# Patient Record
Sex: MALE | Race: WHITE | Employment: UNEMPLOYED | URBAN - METROPOLITAN AREA
[De-identification: names, ages, dates, MRNs, and addresses within clinical notes are randomized per-mention and may not be internally consistent; named-entity substitution may affect disease eponyms.]

---
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
## 2023-09-01 ENCOUNTER — NURSE TRIAGE (OUTPATIENT)
Dept: NURSING | Facility: CLINIC | Age: 18
End: 2023-09-01
Payer: COMMERCIAL

## 2023-09-02 NOTE — TELEPHONE ENCOUNTER
"Pt was playing basketball and running and stepped bottom of left arch popped really hard and then got a little swollen and it is difficult to walk.  Not super bad if sitting.  This happened about 1 1/2 hrs ago.  Pain \"3-4\" on 0/10 pain scale.  Pt will follow Care Advice and follow up with clinic if anything needed further.   Isaura Toney RN  [facility] Nurse Advisor    Reason for Disposition   [1] Limp when walking AND [2] due to a twisted ankle or foot    Additional Information   Negative: Serious injury with multiple fractures (broken bones)   Negative: [1] Major bleeding (e.g., actively dripping or spurting) AND [2] can't be stopped   Negative: Amputation   Negative: Looks like a dislocated joint (very crooked or deformed)   Negative: Sounds like a life-threatening emergency to the triager   Negative: Wound looks infected   Negative: Caused by an animal bite   Negative: Caused by a human bite   Negative: Puncture wound of foot   Negative: Toe injury is main concern   Negative: Cast problems or questions   Negative: Bullet wound, stabbed by knife, or other serious penetrating wound   Negative: Skin is split open or gaping (or length > 1/2 inch or 12 mm)   Negative: [1] Bleeding AND [2] won't stop after 10 minutes of direct pressure (using correct technique)   Negative: [1] Dirt in the wound AND [2] not removed with 15 minutes of scrubbing   Negative: Can't stand (bear weight) or walk   Negative: [1] Numbness (new loss of sensation) of toe(s) AND [2] present now   Negative: Sounds like a serious injury to the triager   Negative: [1] SEVERE pain AND [2] not improved 2 hours after pain medicine/ice packs   Negative: Suspicious history for the injury    Protocols used: Ankle and Foot Injury-A-AH    "